# Patient Record
Sex: MALE | URBAN - METROPOLITAN AREA
[De-identification: names, ages, dates, MRNs, and addresses within clinical notes are randomized per-mention and may not be internally consistent; named-entity substitution may affect disease eponyms.]

---

## 2020-09-08 ENCOUNTER — TELEPHONE ENCOUNTER (OUTPATIENT)
Dept: URBAN - METROPOLITAN AREA CLINIC 35 | Facility: CLINIC | Age: 50
End: 2020-09-08

## 2020-09-21 ENCOUNTER — OFFICE VISIT (OUTPATIENT)
Dept: URBAN - METROPOLITAN AREA CLINIC 37 | Facility: CLINIC | Age: 50
End: 2020-09-21

## 2020-09-21 VITALS — BODY MASS INDEX: 24.99 KG/M2 | WEIGHT: 150 LBS | HEIGHT: 65 IN

## 2020-09-21 PROBLEM — 59621000: Status: ACTIVE | Noted: 2020-09-21

## 2020-09-21 PROBLEM — 267434003: Status: ACTIVE | Noted: 2020-09-21

## 2020-09-21 PROBLEM — 275978004 SCREENING FOR MALIGNANT NEOPLASM OF COLON: Status: ACTIVE | Noted: 2020-09-21

## 2020-09-21 RX ORDER — METHAZOLAMIDE 50 MG/1
2 TABLETS TABLET ORAL ONCE A DAY
Status: ACTIVE | COMMUNITY

## 2020-09-21 RX ORDER — AMLODIPINE BESYLATE 10 MG/1
1 TABLET TABLET ORAL ONCE A DAY
Qty: 30 | Status: ACTIVE | COMMUNITY

## 2020-09-21 RX ORDER — ROSUVASTATIN CALCIUM 5 MG/1
1 TABLET TABLET, FILM COATED ORAL ONCE A DAY
Qty: 30 | Status: ACTIVE | COMMUNITY

## 2020-09-21 RX ORDER — SODIUM SULFATE, POTASSIUM SULFATE, MAGNESIUM SULFATE 17.5; 3.13; 1.6 G/ML; G/ML; G/ML
AS DIRECTED SOLUTION, CONCENTRATE ORAL ONCE
Qty: 1 KIT | Refills: 0 | OUTPATIENT
Start: 2020-09-21

## 2020-09-21 NOTE — HPI-MIGRATED HPI
Colorectal cancer screening : Patients denies -> rectal bleeding, change in bowel habits, constipation, diarrhea, or abdominal pain;   Colorectal cancer screening : Current bowel movement patterns -> One soft BM daily;   Colorectal cancer screening : Family history of GI malignancy: -> Denies;   Colorectal cancer screening : Patient is here for initial consultation for -> first time screening colonoscopy;     Colorectal cancer screening : Denies dysphagia or odynophagia Currently taking anticoagulants/NSAIDs: No;

## 2020-09-23 ENCOUNTER — LAB OUTSIDE AN ENCOUNTER (OUTPATIENT)
Dept: URBAN - METROPOLITAN AREA CLINIC 37 | Facility: CLINIC | Age: 50
End: 2020-09-23

## 2020-10-08 ENCOUNTER — OFFICE VISIT (OUTPATIENT)
Dept: URBAN - METROPOLITAN AREA CLINIC 35 | Facility: CLINIC | Age: 50
End: 2020-10-08

## 2020-10-08 RX ORDER — SODIUM SULFATE, POTASSIUM SULFATE, MAGNESIUM SULFATE 17.5; 3.13; 1.6 G/ML; G/ML; G/ML
AS DIRECTED SOLUTION, CONCENTRATE ORAL ONCE
Qty: 1 KIT | Refills: 0 | Status: ACTIVE | COMMUNITY
Start: 2020-09-21

## 2020-10-08 RX ORDER — AMLODIPINE BESYLATE 10 MG/1
1 TABLET TABLET ORAL ONCE A DAY
Qty: 30 | Status: ACTIVE | COMMUNITY

## 2020-10-08 RX ORDER — ROSUVASTATIN CALCIUM 5 MG/1
1 TABLET TABLET, FILM COATED ORAL ONCE A DAY
Qty: 30 | Status: ACTIVE | COMMUNITY

## 2020-10-08 RX ORDER — METHAZOLAMIDE 50 MG/1
2 TABLETS TABLET ORAL ONCE A DAY
Status: ACTIVE | COMMUNITY

## 2020-10-09 ENCOUNTER — TELEPHONE ENCOUNTER (OUTPATIENT)
Dept: URBAN - METROPOLITAN AREA CLINIC 35 | Facility: CLINIC | Age: 50
End: 2020-10-09

## 2020-10-13 ENCOUNTER — TELEPHONE ENCOUNTER (OUTPATIENT)
Dept: URBAN - METROPOLITAN AREA CLINIC 35 | Facility: CLINIC | Age: 50
End: 2020-10-13

## 2020-10-14 ENCOUNTER — OFFICE VISIT (OUTPATIENT)
Dept: URBAN - METROPOLITAN AREA SURGERY CENTER 8 | Facility: SURGERY CENTER | Age: 50
End: 2020-10-14

## 2020-11-03 ENCOUNTER — OFFICE VISIT (OUTPATIENT)
Dept: URBAN - METROPOLITAN AREA CLINIC 37 | Facility: CLINIC | Age: 50
End: 2020-11-03

## 2020-11-03 ENCOUNTER — DASHBOARD ENCOUNTERS (OUTPATIENT)
Age: 50
End: 2020-11-03

## 2020-11-03 RX ORDER — AMLODIPINE BESYLATE 10 MG/1
1 TABLET TABLET ORAL ONCE A DAY
Qty: 30 | Status: ACTIVE | COMMUNITY

## 2020-11-03 RX ORDER — ROSUVASTATIN CALCIUM 5 MG/1
1 TABLET TABLET, FILM COATED ORAL ONCE A DAY
Qty: 30 | Status: ACTIVE | COMMUNITY

## 2020-11-03 RX ORDER — METHAZOLAMIDE 50 MG/1
2 TABLETS TABLET ORAL ONCE A DAY
Status: ACTIVE | COMMUNITY

## 2020-11-03 NOTE — HPI-MIGRATED HPI
Post-op OV : After Colonoscopy on -> 10/14/2020. at which time three small polyps were detected and resected. Histology showed they were tubular adenoma polyps. Non-bleeding grade II internal and external hemorrhoids were found during retroflexion but not banded. Good bowel prep;   Post-op OV : Patient -> denies any new changes in his/her health status since last OV;   Post-op OV : After EGD on -> ;   Post-op OV : Patient denies -> rectal bleeding, fever, nausea & vomiting since the procedure date;

## 2023-09-22 ENCOUNTER — OFFICE VISIT (OUTPATIENT)
Dept: URBAN - METROPOLITAN AREA SURGERY CENTER 15 | Facility: SURGERY CENTER | Age: 53
End: 2023-09-22

## 2025-07-21 ENCOUNTER — OFFICE VISIT (OUTPATIENT)
Dept: URBAN - METROPOLITAN AREA CLINIC 78 | Facility: CLINIC | Age: 55
End: 2025-07-21
Payer: COMMERCIAL

## 2025-07-21 DIAGNOSIS — K59.09 CHRONIC CONSTIPATION: ICD-10-CM

## 2025-07-21 DIAGNOSIS — K64.4 ANAL SKIN TAG: ICD-10-CM

## 2025-07-21 DIAGNOSIS — K64.3: ICD-10-CM

## 2025-07-21 DIAGNOSIS — K62.89 DECREASED ANAL SPHINCTER TONE: ICD-10-CM

## 2025-07-21 DIAGNOSIS — Z86.0101 PERSONAL HISTORY OF ADENOMATOUS AND SERRATED COLON POLYPS: ICD-10-CM

## 2025-07-21 DIAGNOSIS — Z98.890 HISTORY OF HEMORRHOIDECTOMY: ICD-10-CM

## 2025-07-21 DIAGNOSIS — K62.5 RECTAL BLEEDING: ICD-10-CM

## 2025-07-21 DIAGNOSIS — K64.1 SECOND DEGREE HEMORRHOIDS: ICD-10-CM

## 2025-07-21 PROBLEM — 195469007: Status: ACTIVE | Noted: 2025-07-21

## 2025-07-21 PROCEDURE — 99204 OFFICE O/P NEW MOD 45 MIN: CPT | Performed by: INTERNAL MEDICINE

## 2025-07-21 RX ORDER — AMLODIPINE BESYLATE 10 MG/1
1 TABLET TABLET ORAL ONCE A DAY
Qty: 30 | Status: ACTIVE | COMMUNITY

## 2025-07-21 RX ORDER — ROSUVASTATIN 5 MG/1
1 TABLET TABLET, FILM COATED ORAL ONCE A DAY
Qty: 30 | Status: ACTIVE | COMMUNITY

## 2025-07-21 RX ORDER — METHAZOLAMIDE 50 MG/1
2 TABLETS TABLET ORAL ONCE A DAY
Status: ACTIVE | COMMUNITY

## 2025-07-21 NOTE — PHYSICAL EXAM RECTAL:
Prolapsed Hemorrhoidal tissue soft with mucosa break and mucus , decreased sphincter tone , anal skin tags  Grade 2-3 internal Hemorrhoids

## 2025-07-21 NOTE — HPI-TODAY'S VISIT:
Patient was referred by Dr. Wilfredo Michel  A copy of this document will be sent to the physician.     Chris White, a 54-year-old male, presented for an acute visit focused on rectal bleeding and hemorrhoidal symptoms. He described experiencing bright red blood with every bowel movement, either on wiping or in the toilet, and noted a persistent sensation of wetness and seepage from the rectal area. He reported that something protrudes after bowel movements, which he can push back in, and that these symptoms are accompanied by rectal pressure, particularly when lifting weight at his job as a . Despite thorough cleaning, he remains concerned about the ongoing bleeding and seepage, expressing uncertainty about the seriousness of these symptoms. His proxy was present to assist with care coordination.  In the context of these symptoms, he shared a remote history of external hemorrhoidectomy. He also reported longstanding constipation, though his bowel movements have been steadier in recent months. To manage constipation, he avoids foods that worsen his symptoms, such as plantain, and occasionally eats fruit, but does not use fiber supplements or powders.  Additionally, the patient has a history of a small precancerous colon polyp, which was removed during a colonoscopy in 2020.